# Patient Record
Sex: FEMALE | Race: WHITE | NOT HISPANIC OR LATINO | Employment: FULL TIME | URBAN - METROPOLITAN AREA
[De-identification: names, ages, dates, MRNs, and addresses within clinical notes are randomized per-mention and may not be internally consistent; named-entity substitution may affect disease eponyms.]

---

## 2021-05-18 ENCOUNTER — LAB (OUTPATIENT)
Dept: LAB | Facility: HOSPITAL | Age: 56
End: 2021-05-18

## 2021-05-18 ENCOUNTER — TRANSCRIBE ORDERS (OUTPATIENT)
Dept: LAB | Facility: HOSPITAL | Age: 56
End: 2021-05-18

## 2021-05-18 DIAGNOSIS — Z40.00: ICD-10-CM

## 2021-05-18 DIAGNOSIS — Z79.899 ENCOUNTER FOR LONG-TERM (CURRENT) USE OF OTHER MEDICATIONS: ICD-10-CM

## 2021-05-18 DIAGNOSIS — Z40.00: Primary | ICD-10-CM

## 2021-05-18 LAB
ALBUMIN SERPL-MCNC: 4.1 G/DL (ref 3.5–5.2)
ALBUMIN/GLOB SERPL: 1.5 G/DL
ALP SERPL-CCNC: 94 U/L (ref 39–117)
ALT SERPL W P-5'-P-CCNC: 26 U/L (ref 1–33)
ANION GAP SERPL CALCULATED.3IONS-SCNC: 7 MMOL/L (ref 5–15)
AST SERPL-CCNC: 17 U/L (ref 1–32)
BASOPHILS # BLD AUTO: 0.09 10*3/MM3 (ref 0–0.2)
BASOPHILS NFR BLD AUTO: 1 % (ref 0–1.5)
BILIRUB SERPL-MCNC: 0.4 MG/DL (ref 0–1.2)
BUN SERPL-MCNC: 17 MG/DL (ref 6–20)
BUN/CREAT SERPL: 18.1 (ref 7–25)
CALCIUM SPEC-SCNC: 10 MG/DL (ref 8.6–10.5)
CHLORIDE SERPL-SCNC: 104 MMOL/L (ref 98–107)
CO2 SERPL-SCNC: 29 MMOL/L (ref 22–29)
CREAT SERPL-MCNC: 0.94 MG/DL (ref 0.57–1)
DEPRECATED RDW RBC AUTO: 39.8 FL (ref 37–54)
EOSINOPHIL # BLD AUTO: 0.27 10*3/MM3 (ref 0–0.4)
EOSINOPHIL NFR BLD AUTO: 2.9 % (ref 0.3–6.2)
ERYTHROCYTE [DISTWIDTH] IN BLOOD BY AUTOMATED COUNT: 12.3 % (ref 12.3–15.4)
GFR SERPL CREATININE-BSD FRML MDRD: 62 ML/MIN/1.73
GLOBULIN UR ELPH-MCNC: 2.8 GM/DL
GLUCOSE SERPL-MCNC: 113 MG/DL (ref 65–99)
HAV IGM SERPL QL IA: NORMAL
HBV CORE IGM SERPL QL IA: NORMAL
HBV SURFACE AG SERPL QL IA: NORMAL
HCT VFR BLD AUTO: 44.5 % (ref 34–46.6)
HCV AB SER DONR QL: NORMAL
HGB BLD-MCNC: 14.8 G/DL (ref 12–15.9)
IMM GRANULOCYTES # BLD AUTO: 0.05 10*3/MM3 (ref 0–0.05)
IMM GRANULOCYTES NFR BLD AUTO: 0.5 % (ref 0–0.5)
LYMPHOCYTES # BLD AUTO: 2.8 10*3/MM3 (ref 0.7–3.1)
LYMPHOCYTES NFR BLD AUTO: 30.2 % (ref 19.6–45.3)
MCH RBC QN AUTO: 29.8 PG (ref 26.6–33)
MCHC RBC AUTO-ENTMCNC: 33.3 G/DL (ref 31.5–35.7)
MCV RBC AUTO: 89.5 FL (ref 79–97)
MONOCYTES # BLD AUTO: 0.52 10*3/MM3 (ref 0.1–0.9)
MONOCYTES NFR BLD AUTO: 5.6 % (ref 5–12)
NEUTROPHILS NFR BLD AUTO: 5.54 10*3/MM3 (ref 1.7–7)
NEUTROPHILS NFR BLD AUTO: 59.8 % (ref 42.7–76)
NRBC BLD AUTO-RTO: 0.1 /100 WBC (ref 0–0.2)
PLATELET # BLD AUTO: 350 10*3/MM3 (ref 140–450)
PMV BLD AUTO: 10.1 FL (ref 6–12)
POTASSIUM SERPL-SCNC: 4.7 MMOL/L (ref 3.5–5.2)
PROT SERPL-MCNC: 6.9 G/DL (ref 6–8.5)
RBC # BLD AUTO: 4.97 10*6/MM3 (ref 3.77–5.28)
SODIUM SERPL-SCNC: 140 MMOL/L (ref 136–145)
WBC # BLD AUTO: 9.27 10*3/MM3 (ref 3.4–10.8)

## 2021-05-18 PROCEDURE — 80074 ACUTE HEPATITIS PANEL: CPT

## 2021-05-18 PROCEDURE — 80053 COMPREHEN METABOLIC PANEL: CPT

## 2021-05-18 PROCEDURE — 85025 COMPLETE CBC W/AUTO DIFF WBC: CPT

## 2021-05-18 PROCEDURE — 36415 COLL VENOUS BLD VENIPUNCTURE: CPT

## 2021-05-25 ENCOUNTER — TRANSCRIBE ORDERS (OUTPATIENT)
Dept: LAB | Facility: HOSPITAL | Age: 56
End: 2021-05-25

## 2021-05-25 ENCOUNTER — LAB (OUTPATIENT)
Dept: LAB | Facility: HOSPITAL | Age: 56
End: 2021-05-25

## 2021-05-25 DIAGNOSIS — Z79.899 ENCOUNTER FOR LONG-TERM (CURRENT) USE OF OTHER MEDICATIONS: ICD-10-CM

## 2021-05-25 DIAGNOSIS — L40.0 PSORIASIS VULGARIS: ICD-10-CM

## 2021-05-25 DIAGNOSIS — L40.0 PSORIASIS VULGARIS: Primary | ICD-10-CM

## 2021-05-25 LAB
ALBUMIN SERPL-MCNC: 4.2 G/DL (ref 3.5–5.2)
ALBUMIN/GLOB SERPL: 1.6 G/DL
ALP SERPL-CCNC: 93 U/L (ref 39–117)
ALT SERPL W P-5'-P-CCNC: 17 U/L (ref 1–33)
ANION GAP SERPL CALCULATED.3IONS-SCNC: 9.5 MMOL/L (ref 5–15)
AST SERPL-CCNC: 11 U/L (ref 1–32)
BASOPHILS # BLD AUTO: 0.1 10*3/MM3 (ref 0–0.2)
BASOPHILS NFR BLD AUTO: 0.8 % (ref 0–1.5)
BILIRUB SERPL-MCNC: 0.2 MG/DL (ref 0–1.2)
BUN SERPL-MCNC: 18 MG/DL (ref 6–20)
BUN/CREAT SERPL: 23.1 (ref 7–25)
CALCIUM SPEC-SCNC: 10.1 MG/DL (ref 8.6–10.5)
CHLORIDE SERPL-SCNC: 104 MMOL/L (ref 98–107)
CO2 SERPL-SCNC: 24.5 MMOL/L (ref 22–29)
CREAT SERPL-MCNC: 0.78 MG/DL (ref 0.57–1)
DEPRECATED RDW RBC AUTO: 40.7 FL (ref 37–54)
EOSINOPHIL # BLD AUTO: 0.21 10*3/MM3 (ref 0–0.4)
EOSINOPHIL NFR BLD AUTO: 1.7 % (ref 0.3–6.2)
ERYTHROCYTE [DISTWIDTH] IN BLOOD BY AUTOMATED COUNT: 12.4 % (ref 12.3–15.4)
GFR SERPL CREATININE-BSD FRML MDRD: 77 ML/MIN/1.73
GLOBULIN UR ELPH-MCNC: 2.7 GM/DL
GLUCOSE SERPL-MCNC: 112 MG/DL (ref 65–99)
HCT VFR BLD AUTO: 42.1 % (ref 34–46.6)
HGB BLD-MCNC: 13.8 G/DL (ref 12–15.9)
IMM GRANULOCYTES # BLD AUTO: 0.04 10*3/MM3 (ref 0–0.05)
IMM GRANULOCYTES NFR BLD AUTO: 0.3 % (ref 0–0.5)
LYMPHOCYTES # BLD AUTO: 3.07 10*3/MM3 (ref 0.7–3.1)
LYMPHOCYTES NFR BLD AUTO: 25 % (ref 19.6–45.3)
MCH RBC QN AUTO: 29.7 PG (ref 26.6–33)
MCHC RBC AUTO-ENTMCNC: 32.8 G/DL (ref 31.5–35.7)
MCV RBC AUTO: 90.5 FL (ref 79–97)
MONOCYTES # BLD AUTO: 0.63 10*3/MM3 (ref 0.1–0.9)
MONOCYTES NFR BLD AUTO: 5.1 % (ref 5–12)
NEUTROPHILS NFR BLD AUTO: 67.1 % (ref 42.7–76)
NEUTROPHILS NFR BLD AUTO: 8.21 10*3/MM3 (ref 1.7–7)
NRBC BLD AUTO-RTO: 0 /100 WBC (ref 0–0.2)
PLATELET # BLD AUTO: 341 10*3/MM3 (ref 140–450)
PMV BLD AUTO: 10.2 FL (ref 6–12)
POTASSIUM SERPL-SCNC: 4.1 MMOL/L (ref 3.5–5.2)
PROT SERPL-MCNC: 6.9 G/DL (ref 6–8.5)
RBC # BLD AUTO: 4.65 10*6/MM3 (ref 3.77–5.28)
SODIUM SERPL-SCNC: 138 MMOL/L (ref 136–145)
WBC # BLD AUTO: 12.26 10*3/MM3 (ref 3.4–10.8)

## 2021-05-25 PROCEDURE — 36415 COLL VENOUS BLD VENIPUNCTURE: CPT

## 2021-05-25 PROCEDURE — 80053 COMPREHEN METABOLIC PANEL: CPT

## 2021-05-25 PROCEDURE — 85025 COMPLETE CBC W/AUTO DIFF WBC: CPT

## 2021-06-01 ENCOUNTER — LAB (OUTPATIENT)
Dept: LAB | Facility: HOSPITAL | Age: 56
End: 2021-06-01

## 2021-06-01 ENCOUNTER — TRANSCRIBE ORDERS (OUTPATIENT)
Dept: LAB | Facility: HOSPITAL | Age: 56
End: 2021-06-01

## 2021-06-01 DIAGNOSIS — Z79.899 ENCOUNTER FOR LONG-TERM (CURRENT) USE OF OTHER MEDICATIONS: ICD-10-CM

## 2021-06-01 DIAGNOSIS — L40.0 PSORIASIS VULGARIS: Primary | ICD-10-CM

## 2021-06-01 DIAGNOSIS — L40.0 PSORIASIS VULGARIS: ICD-10-CM

## 2021-06-01 LAB
ALBUMIN SERPL-MCNC: 4 G/DL (ref 3.5–5.2)
ALBUMIN/GLOB SERPL: 1.4 G/DL
ALP SERPL-CCNC: 103 U/L (ref 39–117)
ALT SERPL W P-5'-P-CCNC: 18 U/L (ref 1–33)
ANION GAP SERPL CALCULATED.3IONS-SCNC: 9.2 MMOL/L (ref 5–15)
AST SERPL-CCNC: 15 U/L (ref 1–32)
BASOPHILS # BLD AUTO: 0.11 10*3/MM3 (ref 0–0.2)
BASOPHILS NFR BLD AUTO: 0.9 % (ref 0–1.5)
BILIRUB SERPL-MCNC: 0.4 MG/DL (ref 0–1.2)
BUN SERPL-MCNC: 19 MG/DL (ref 6–20)
BUN/CREAT SERPL: 24.4 (ref 7–25)
CALCIUM SPEC-SCNC: 9.7 MG/DL (ref 8.6–10.5)
CHLORIDE SERPL-SCNC: 101 MMOL/L (ref 98–107)
CO2 SERPL-SCNC: 27.8 MMOL/L (ref 22–29)
CREAT SERPL-MCNC: 0.78 MG/DL (ref 0.57–1)
DEPRECATED RDW RBC AUTO: 40.7 FL (ref 37–54)
EOSINOPHIL # BLD AUTO: 0.25 10*3/MM3 (ref 0–0.4)
EOSINOPHIL NFR BLD AUTO: 2 % (ref 0.3–6.2)
ERYTHROCYTE [DISTWIDTH] IN BLOOD BY AUTOMATED COUNT: 12.5 % (ref 12.3–15.4)
GFR SERPL CREATININE-BSD FRML MDRD: 77 ML/MIN/1.73
GLOBULIN UR ELPH-MCNC: 2.9 GM/DL
GLUCOSE SERPL-MCNC: 148 MG/DL (ref 65–99)
HCT VFR BLD AUTO: 42.4 % (ref 34–46.6)
HGB BLD-MCNC: 14.1 G/DL (ref 12–15.9)
IMM GRANULOCYTES # BLD AUTO: 0.07 10*3/MM3 (ref 0–0.05)
IMM GRANULOCYTES NFR BLD AUTO: 0.6 % (ref 0–0.5)
LYMPHOCYTES # BLD AUTO: 3.34 10*3/MM3 (ref 0.7–3.1)
LYMPHOCYTES NFR BLD AUTO: 26.6 % (ref 19.6–45.3)
MCH RBC QN AUTO: 30.2 PG (ref 26.6–33)
MCHC RBC AUTO-ENTMCNC: 33.3 G/DL (ref 31.5–35.7)
MCV RBC AUTO: 90.8 FL (ref 79–97)
MONOCYTES # BLD AUTO: 0.66 10*3/MM3 (ref 0.1–0.9)
MONOCYTES NFR BLD AUTO: 5.3 % (ref 5–12)
NEUTROPHILS NFR BLD AUTO: 64.6 % (ref 42.7–76)
NEUTROPHILS NFR BLD AUTO: 8.14 10*3/MM3 (ref 1.7–7)
NRBC BLD AUTO-RTO: 0 /100 WBC (ref 0–0.2)
PLATELET # BLD AUTO: 375 10*3/MM3 (ref 140–450)
PMV BLD AUTO: 10 FL (ref 6–12)
POTASSIUM SERPL-SCNC: 3.6 MMOL/L (ref 3.5–5.2)
PROT SERPL-MCNC: 6.9 G/DL (ref 6–8.5)
RBC # BLD AUTO: 4.67 10*6/MM3 (ref 3.77–5.28)
SODIUM SERPL-SCNC: 138 MMOL/L (ref 136–145)
WBC # BLD AUTO: 12.57 10*3/MM3 (ref 3.4–10.8)

## 2021-06-01 PROCEDURE — 80053 COMPREHEN METABOLIC PANEL: CPT

## 2021-06-01 PROCEDURE — 36415 COLL VENOUS BLD VENIPUNCTURE: CPT

## 2021-06-01 PROCEDURE — 85025 COMPLETE CBC W/AUTO DIFF WBC: CPT

## 2021-07-08 ENCOUNTER — TRANSCRIBE ORDERS (OUTPATIENT)
Dept: LAB | Facility: HOSPITAL | Age: 56
End: 2021-07-08

## 2021-07-08 ENCOUNTER — LAB (OUTPATIENT)
Dept: LAB | Facility: HOSPITAL | Age: 56
End: 2021-07-08

## 2021-07-08 DIAGNOSIS — L40.0 PSORIASIS VULGARIS: Primary | ICD-10-CM

## 2021-07-08 DIAGNOSIS — Z79.899 ENCOUNTER FOR LONG-TERM (CURRENT) USE OF OTHER MEDICATIONS: ICD-10-CM

## 2021-07-08 LAB
ALBUMIN SERPL-MCNC: 4.6 G/DL (ref 3.5–5.2)
ALBUMIN/GLOB SERPL: 2 G/DL
ALP SERPL-CCNC: 93 U/L (ref 39–117)
ALT SERPL W P-5'-P-CCNC: 24 U/L (ref 1–33)
ANION GAP SERPL CALCULATED.3IONS-SCNC: 10.6 MMOL/L (ref 5–15)
AST SERPL-CCNC: 14 U/L (ref 1–32)
BASOPHILS # BLD AUTO: 0.1 10*3/MM3 (ref 0–0.2)
BASOPHILS NFR BLD AUTO: 1.1 % (ref 0–1.5)
BILIRUB SERPL-MCNC: 0.2 MG/DL (ref 0–1.2)
BUN SERPL-MCNC: 11 MG/DL (ref 6–20)
BUN/CREAT SERPL: 13.3 (ref 7–25)
CALCIUM SPEC-SCNC: 9.8 MG/DL (ref 8.6–10.5)
CHLORIDE SERPL-SCNC: 103 MMOL/L (ref 98–107)
CO2 SERPL-SCNC: 25.4 MMOL/L (ref 22–29)
CREAT SERPL-MCNC: 0.83 MG/DL (ref 0.57–1)
DEPRECATED RDW RBC AUTO: 42.6 FL (ref 37–54)
EOSINOPHIL # BLD AUTO: 0.18 10*3/MM3 (ref 0–0.4)
EOSINOPHIL NFR BLD AUTO: 1.9 % (ref 0.3–6.2)
ERYTHROCYTE [DISTWIDTH] IN BLOOD BY AUTOMATED COUNT: 13.1 % (ref 12.3–15.4)
GFR SERPL CREATININE-BSD FRML MDRD: 72 ML/MIN/1.73
GLOBULIN UR ELPH-MCNC: 2.3 GM/DL
GLUCOSE SERPL-MCNC: 107 MG/DL (ref 65–99)
HCT VFR BLD AUTO: 43.7 % (ref 34–46.6)
HGB BLD-MCNC: 14.8 G/DL (ref 12–15.9)
IMM GRANULOCYTES # BLD AUTO: 0.02 10*3/MM3 (ref 0–0.05)
IMM GRANULOCYTES NFR BLD AUTO: 0.2 % (ref 0–0.5)
LYMPHOCYTES # BLD AUTO: 2.31 10*3/MM3 (ref 0.7–3.1)
LYMPHOCYTES NFR BLD AUTO: 24.7 % (ref 19.6–45.3)
MCH RBC QN AUTO: 30.6 PG (ref 26.6–33)
MCHC RBC AUTO-ENTMCNC: 33.9 G/DL (ref 31.5–35.7)
MCV RBC AUTO: 90.5 FL (ref 79–97)
MONOCYTES # BLD AUTO: 0.6 10*3/MM3 (ref 0.1–0.9)
MONOCYTES NFR BLD AUTO: 6.4 % (ref 5–12)
NEUTROPHILS NFR BLD AUTO: 6.14 10*3/MM3 (ref 1.7–7)
NEUTROPHILS NFR BLD AUTO: 65.7 % (ref 42.7–76)
NRBC BLD AUTO-RTO: 0 /100 WBC (ref 0–0.2)
PLATELET # BLD AUTO: 374 10*3/MM3 (ref 140–450)
PMV BLD AUTO: 10.3 FL (ref 6–12)
POTASSIUM SERPL-SCNC: 3.9 MMOL/L (ref 3.5–5.2)
PROT SERPL-MCNC: 6.9 G/DL (ref 6–8.5)
RBC # BLD AUTO: 4.83 10*6/MM3 (ref 3.77–5.28)
SODIUM SERPL-SCNC: 139 MMOL/L (ref 136–145)
WBC # BLD AUTO: 9.35 10*3/MM3 (ref 3.4–10.8)

## 2021-07-08 PROCEDURE — 80053 COMPREHEN METABOLIC PANEL: CPT

## 2021-07-08 PROCEDURE — 36415 COLL VENOUS BLD VENIPUNCTURE: CPT

## 2021-07-08 PROCEDURE — 85025 COMPLETE CBC W/AUTO DIFF WBC: CPT

## 2021-07-22 ENCOUNTER — TRANSCRIBE ORDERS (OUTPATIENT)
Dept: LAB | Facility: HOSPITAL | Age: 56
End: 2021-07-22

## 2021-07-22 ENCOUNTER — LAB (OUTPATIENT)
Dept: LAB | Facility: HOSPITAL | Age: 56
End: 2021-07-22

## 2021-07-22 DIAGNOSIS — Z79.899 ENCOUNTER FOR LONG-TERM (CURRENT) USE OF OTHER MEDICATIONS: ICD-10-CM

## 2021-07-22 DIAGNOSIS — L40.0 PSORIASIS VULGARIS: Primary | ICD-10-CM

## 2021-07-22 DIAGNOSIS — L40.0 PSORIASIS VULGARIS: ICD-10-CM

## 2021-07-22 LAB
ALBUMIN SERPL-MCNC: 4 G/DL (ref 3.5–5.2)
ALBUMIN/GLOB SERPL: 1.7 G/DL
ALP SERPL-CCNC: 83 U/L (ref 39–117)
ALT SERPL W P-5'-P-CCNC: 18 U/L (ref 1–33)
ANION GAP SERPL CALCULATED.3IONS-SCNC: 8.2 MMOL/L (ref 5–15)
AST SERPL-CCNC: 14 U/L (ref 1–32)
BASOPHILS # BLD AUTO: 0.12 10*3/MM3 (ref 0–0.2)
BASOPHILS NFR BLD AUTO: 1.2 % (ref 0–1.5)
BILIRUB SERPL-MCNC: <0.2 MG/DL (ref 0–1.2)
BUN SERPL-MCNC: 19 MG/DL (ref 6–20)
BUN/CREAT SERPL: 23.2 (ref 7–25)
CALCIUM SPEC-SCNC: 9.4 MG/DL (ref 8.6–10.5)
CHLORIDE SERPL-SCNC: 105 MMOL/L (ref 98–107)
CO2 SERPL-SCNC: 25.8 MMOL/L (ref 22–29)
CREAT SERPL-MCNC: 0.82 MG/DL (ref 0.57–1)
DEPRECATED RDW RBC AUTO: 46.4 FL (ref 37–54)
EOSINOPHIL # BLD AUTO: 0.28 10*3/MM3 (ref 0–0.4)
EOSINOPHIL NFR BLD AUTO: 2.8 % (ref 0.3–6.2)
ERYTHROCYTE [DISTWIDTH] IN BLOOD BY AUTOMATED COUNT: 13.6 % (ref 12.3–15.4)
GFR SERPL CREATININE-BSD FRML MDRD: 73 ML/MIN/1.73
GLOBULIN UR ELPH-MCNC: 2.3 GM/DL
GLUCOSE SERPL-MCNC: 96 MG/DL (ref 65–99)
HCT VFR BLD AUTO: 45.5 % (ref 34–46.6)
HGB BLD-MCNC: 14.4 G/DL (ref 12–15.9)
IMM GRANULOCYTES # BLD AUTO: 0.05 10*3/MM3 (ref 0–0.05)
IMM GRANULOCYTES NFR BLD AUTO: 0.5 % (ref 0–0.5)
LYMPHOCYTES # BLD AUTO: 2.78 10*3/MM3 (ref 0.7–3.1)
LYMPHOCYTES NFR BLD AUTO: 27.4 % (ref 19.6–45.3)
MCH RBC QN AUTO: 29.4 PG (ref 26.6–33)
MCHC RBC AUTO-ENTMCNC: 31.6 G/DL (ref 31.5–35.7)
MCV RBC AUTO: 92.9 FL (ref 79–97)
MONOCYTES # BLD AUTO: 0.74 10*3/MM3 (ref 0.1–0.9)
MONOCYTES NFR BLD AUTO: 7.3 % (ref 5–12)
NEUTROPHILS NFR BLD AUTO: 6.19 10*3/MM3 (ref 1.7–7)
NEUTROPHILS NFR BLD AUTO: 60.8 % (ref 42.7–76)
NRBC BLD AUTO-RTO: 0 /100 WBC (ref 0–0.2)
PLATELET # BLD AUTO: 347 10*3/MM3 (ref 140–450)
PMV BLD AUTO: 10.7 FL (ref 6–12)
POTASSIUM SERPL-SCNC: 4.5 MMOL/L (ref 3.5–5.2)
PROT SERPL-MCNC: 6.3 G/DL (ref 6–8.5)
RBC # BLD AUTO: 4.9 10*6/MM3 (ref 3.77–5.28)
SODIUM SERPL-SCNC: 139 MMOL/L (ref 136–145)
WBC # BLD AUTO: 10.16 10*3/MM3 (ref 3.4–10.8)

## 2021-07-22 PROCEDURE — 85025 COMPLETE CBC W/AUTO DIFF WBC: CPT

## 2021-07-22 PROCEDURE — 36415 COLL VENOUS BLD VENIPUNCTURE: CPT

## 2021-07-22 PROCEDURE — 80053 COMPREHEN METABOLIC PANEL: CPT

## 2021-10-28 ENCOUNTER — TREATMENT (OUTPATIENT)
Dept: PHYSICAL THERAPY | Facility: CLINIC | Age: 56
End: 2021-10-28

## 2021-10-28 DIAGNOSIS — M25.571 RIGHT ANKLE PAIN, UNSPECIFIED CHRONICITY: ICD-10-CM

## 2021-10-28 DIAGNOSIS — M65.9 TENOSYNOVITIS OF FOOT: ICD-10-CM

## 2021-10-28 DIAGNOSIS — M79.672 PAIN IN BOTH FEET: ICD-10-CM

## 2021-10-28 DIAGNOSIS — M79.671 PAIN IN BOTH FEET: ICD-10-CM

## 2021-10-28 DIAGNOSIS — M25.572 LEFT ANKLE PAIN, UNSPECIFIED CHRONICITY: Primary | ICD-10-CM

## 2021-10-28 PROCEDURE — 97110 THERAPEUTIC EXERCISES: CPT | Performed by: PHYSICAL THERAPIST

## 2021-10-28 PROCEDURE — 97162 PT EVAL MOD COMPLEX 30 MIN: CPT | Performed by: PHYSICAL THERAPIST

## 2021-10-28 PROCEDURE — 97140 MANUAL THERAPY 1/> REGIONS: CPT | Performed by: PHYSICAL THERAPIST

## 2021-10-28 NOTE — PROGRESS NOTES
Physical Therapy Initial Evaluation and Plan of Care    Patient: Sweetie Pickard   : 1965  Diagnosis/ICD-10 Code:  Left ankle pain, unspecified chronicity [M25.572]  Referring practitioner: Marisol Mccormack DPM  Date of Initial Visit: 10/28/2021  Today's Date: 10/28/2021  Patient seen for 1 sessions           Subjective Questionnaire: FAAM = 51/84, indicating 61% ability and 39% impairment      Subjective Evaluation    History of Present Illness  Mechanism of injury: Pt underwent bunion surgery B feet in 2019 (months apart). Then had hardware removed this year. Has had pain since both surgeries with more pain after hardware removal. States since then, her R big toe and 2nd toe do not touch the ground and she is bearing more wt on outside of foot. Pt her R ankle has started to hurt now because of this. Pain in R foot is along lateral side of foot and up to ankle. Has pain at 1st MTP jt B. L second toe is crossing over 3rd toe. States today is a good day, she has been on vacation all week and has not worked. States she in on her feet all day at work and her feet start to swell. Reports that as her feet swell, it feels like someone is pulling her toes apart. Swelling does not extend to her ankles. Standing and walking increases pain. Difficulty walking on uneven surfaces. Difficulty walking up and down hills with going down hill more challenging. Shifts her wt a lot to one leg due to pain. Has had injections in B feet but cannot have any more for a while. Gets some relief with ice. Has tried different shoes. Has custom orthotics but states they are not very comfortable.      Patient Occupation:  at [employer], full time Pain  Current pain ratin  At best pain ratin  At worst pain ratin  Location: B feet  Quality: dull ache and pressure  Relieving factors: rest  Aggravating factors: movement, standing, stairs, ambulation, prolonged positioning and squatting  Progression: no  change    Social Support  Lives with: spouse and adult children    Treatments  Previous treatment: injection treatment  Patient Goals  Patient goals for therapy: decreased edema, decreased pain, improved balance, increased motion, increased strength and return to sport/leisure activities  Patient goal: be able to work with less pain.           Objective          Observations   Left Ankle/Foot   Positive for deformity.     Right Ankle/Foot   Positive for deformity.     Additional Ankle/Foot Observation Details  B hallux valugs, R significantly greater. On R foot, 2nd toe starting to go over 3rd toe. In standing, R great toe does not touch the ground with >0.5cm clearance. Toes with extension at MTP joints and flexion at PIP joints. Scar well healed from bunion removal and subsequent hardware removal. Mild edema at 1st MTP joints and anterior ankle B, R>L. Callused area present at 5th MTP B, L>R.  R foot turned out slightly compared to L with hips and knees in neutral.  Gait: no assistive device, decreased R toe off, increased wt bearing on lateral aspect of R foot.    Palpation   Left   Tenderness of the peroneus.     Tenderness   Left Ankle/Foot   Tenderness in the fifth metatarsal base, metatarsal head and peroneal tendon.     Additional Tenderness Details  Tenderness at dorsum of R foot along extensor digitorum and extensor hallux tendons.    Active Range of Motion   Left Ankle/Foot   Normal active range of motion    Right Ankle/Foot   Normal active range of motion    Additional Active Range of Motion Details  Mild tightness B DF.    Joint Play   Left Ankle/Foot  Hypomobile in the midfoot and forefoot.     Right Ankle/Foot  Hypomobile in the midfoot and forefoot.     Strength/Myotome Testing     Left Ankle/Foot   Dorsiflexion: 4+  Plantar flexion: 4+  Inversion: 4+  Eversion: 4+    Right Ankle/Foot   Dorsiflexion: 4+  Plantar flexion: 4+  Inversion: 4-  Eversion: 3+          Assessment & Plan      Assessment  Impairments: abnormal gait, abnormal or restricted ROM, activity intolerance, impaired balance, impaired physical strength, lacks appropriate home exercise program and pain with function  Assessment details: Pt is 55 yo female with B foot pain, R>L, following bunion removal 2 yr ago and then hardware removal this year. Pt presents with decreased AROM and mobility of mid and forefoot B. Pt with tightness of extensor digitorum and extensor hallux with tenderness of tendons to palpation. Pt with weakness of R ankle with difficulty walkong on uneven surfaces due to instability. Pt is having difficulty with standing. Difficulty with ambulation with impaired gait pattern and progression of wt over R foot during stance phase. Pt is having difficulty with severe pain at end of work shift. FAAM indicates 61% ability.    .Patient presents with the impairments listed above and based on the objective findings and the physical therapy evaluation, the patient’s condition has the potential to improve in response to therapy.   The patient’s condition and/or services required are at a level of complexity that necessitates the skill & supervision of a physical therapist.    Prognosis: good  Functional Limitations: sleeping, walking, uncomfortable because of pain, sitting, standing and stooping  Goals  Plan Goals: STG:  - R great toe to touch ground and bear wt in static standing with min cues in 3 weeks.  - PT to assess SLS in 3-4 visits.  - Pt to be independent with HEP in 3 weeks.  LTG:  - Improve FAAM score to 80% or better ability by discharge.  - Increase R ankle INV and EV strength to 4/5 or greater for improved ability to ambulate on uneven surfaces by discharge.  - Ambulation to be returned to OF by discharge.  - Pt to rate max pain at 3-4/10 while working by discharge.    Plan  Therapy options: will be seen for skilled physical therapy services  Planned modality interventions: thermotherapy (hydrocollator  packs), cryotherapy, ultrasound, dry needling and electrical stimulation/Russian stimulation  Other planned modality interventions: modalities as indicated  Planned therapy interventions: balance/weight-bearing training, manual therapy, flexibility, functional ROM exercises, gait training, home exercise program, joint mobilization, neuromuscular re-education, soft tissue mobilization, strengthening, stretching, therapeutic activities, postural training, body mechanics training and transfer training  Frequency: 2x week  Duration in weeks: 10  Treatment plan discussed with: patient  Plan details: Talked with pt about benefit of using knee high compression socks while working for management of swelling. Will continue discussion at subsequent visits.        Timed:         Manual Therapy:    15     mins  66969;     Therapeutic Exercise:    10     mins  94843;     Neuromuscular Mikel:        mins  25650;    Therapeutic Activity:          mins  38814;     Gait Training:           mins  20811;     Ultrasound:          mins  23698;    Ionto                                   mins   92524  Can Repos          mins 67118    Un-Timed:  Electrical Stimulation:         mins  57743 ( );  Dry Needling          mins self-pay  Traction          mins 68550  Low Eval          Mins  53442  Mod Eval     30     Mins  40816  High Eval                           Mins  84656  Self - Care                          mins  01545        Timed Treatment:   25   mins   Total Treatment:     55   mins    PT SIGNATURE: Florinda Dominguez, PT   DATE TREATMENT INITIATED: 10/28/2021    Initial Certification  Certification Period: 1/26/2022  I certify that the therapy services are furnished while this patient is under my care.  The services outlined above are required by this patient, and will be reviewed every 90 days.     PHYSICIAN: Marisol Mccormack DPM ___________________________________________________________     DATE:   __________________________________________________________    Please sign and return via fax to [phone].. Thank you, [facility].

## 2021-11-04 ENCOUNTER — LAB (OUTPATIENT)
Dept: LAB | Facility: HOSPITAL | Age: 56
End: 2021-11-04

## 2021-11-04 ENCOUNTER — TRANSCRIBE ORDERS (OUTPATIENT)
Dept: ADMINISTRATIVE | Facility: HOSPITAL | Age: 56
End: 2021-11-04

## 2021-11-04 DIAGNOSIS — L40.0 PSORIASIS VULGARIS: ICD-10-CM

## 2021-11-04 DIAGNOSIS — L40.0 PSORIASIS VULGARIS: Primary | ICD-10-CM

## 2021-11-04 LAB
ALBUMIN SERPL-MCNC: 4.2 G/DL (ref 3.5–5.2)
ALP SERPL-CCNC: 98 U/L (ref 39–117)
ALT SERPL W P-5'-P-CCNC: 21 U/L (ref 1–33)
AST SERPL-CCNC: 18 U/L (ref 1–32)
BASOPHILS # BLD AUTO: 0.1 10*3/MM3 (ref 0–0.2)
BASOPHILS NFR BLD AUTO: 0.9 % (ref 0–1.5)
BILIRUB CONJ SERPL-MCNC: <0.2 MG/DL (ref 0–0.3)
BILIRUB INDIRECT SERPL-MCNC: NORMAL MG/DL
BILIRUB SERPL-MCNC: 0.3 MG/DL (ref 0–1.2)
DEPRECATED RDW RBC AUTO: 42.5 FL (ref 37–54)
EOSINOPHIL # BLD AUTO: 0.27 10*3/MM3 (ref 0–0.4)
EOSINOPHIL NFR BLD AUTO: 2.4 % (ref 0.3–6.2)
ERYTHROCYTE [DISTWIDTH] IN BLOOD BY AUTOMATED COUNT: 13.3 % (ref 12.3–15.4)
HCT VFR BLD AUTO: 41.1 % (ref 34–46.6)
HGB BLD-MCNC: 13.9 G/DL (ref 12–15.9)
IMM GRANULOCYTES # BLD AUTO: 0.06 10*3/MM3 (ref 0–0.05)
IMM GRANULOCYTES NFR BLD AUTO: 0.5 % (ref 0–0.5)
LYMPHOCYTES # BLD AUTO: 2.78 10*3/MM3 (ref 0.7–3.1)
LYMPHOCYTES NFR BLD AUTO: 25.2 % (ref 19.6–45.3)
MCH RBC QN AUTO: 30.2 PG (ref 26.6–33)
MCHC RBC AUTO-ENTMCNC: 33.8 G/DL (ref 31.5–35.7)
MCV RBC AUTO: 89.2 FL (ref 79–97)
MONOCYTES # BLD AUTO: 0.85 10*3/MM3 (ref 0.1–0.9)
MONOCYTES NFR BLD AUTO: 7.7 % (ref 5–12)
NEUTROPHILS NFR BLD AUTO: 6.97 10*3/MM3 (ref 1.7–7)
NEUTROPHILS NFR BLD AUTO: 63.3 % (ref 42.7–76)
NRBC BLD AUTO-RTO: 0 /100 WBC (ref 0–0.2)
PLATELET # BLD AUTO: 403 10*3/MM3 (ref 140–450)
PMV BLD AUTO: 10 FL (ref 6–12)
PROT SERPL-MCNC: 6.8 G/DL (ref 6–8.5)
RBC # BLD AUTO: 4.61 10*6/MM3 (ref 3.77–5.28)
WBC # BLD AUTO: 11.03 10*3/MM3 (ref 3.4–10.8)

## 2021-11-04 PROCEDURE — 80076 HEPATIC FUNCTION PANEL: CPT

## 2021-11-04 PROCEDURE — 36415 COLL VENOUS BLD VENIPUNCTURE: CPT

## 2021-11-04 PROCEDURE — 85025 COMPLETE CBC W/AUTO DIFF WBC: CPT

## 2021-11-09 ENCOUNTER — TREATMENT (OUTPATIENT)
Dept: PHYSICAL THERAPY | Facility: CLINIC | Age: 56
End: 2021-11-09

## 2021-11-09 DIAGNOSIS — M25.572 LEFT ANKLE PAIN, UNSPECIFIED CHRONICITY: Primary | ICD-10-CM

## 2021-11-09 DIAGNOSIS — M25.571 RIGHT ANKLE PAIN, UNSPECIFIED CHRONICITY: ICD-10-CM

## 2021-11-09 DIAGNOSIS — M79.672 PAIN IN BOTH FEET: ICD-10-CM

## 2021-11-09 DIAGNOSIS — M65.9 TENOSYNOVITIS OF FOOT: ICD-10-CM

## 2021-11-09 DIAGNOSIS — M79.671 PAIN IN BOTH FEET: ICD-10-CM

## 2021-11-09 PROCEDURE — 97110 THERAPEUTIC EXERCISES: CPT | Performed by: PHYSICAL THERAPIST

## 2021-11-09 PROCEDURE — 97140 MANUAL THERAPY 1/> REGIONS: CPT | Performed by: PHYSICAL THERAPIST

## 2021-11-09 PROCEDURE — 97035 APP MDLTY 1+ULTRASOUND EA 15: CPT | Performed by: PHYSICAL THERAPIST

## 2021-11-09 NOTE — PROGRESS NOTES
Physical Therapy Daily Progress Note      Patient: Sweetie Pickard   : 1965  Diagnosis/ICD-10 Code:  Left ankle pain, unspecified chronicity [M25.572]   Problems Addressed this Visit     None      Visit Diagnoses     Left ankle pain, unspecified chronicity    -  Primary    Right ankle pain, unspecified chronicity        Pain in both feet        Tenosynovitis of foot          Diagnoses       Codes Comments    Left ankle pain, unspecified chronicity    -  Primary ICD-10-CM: M25.572  ICD-9-CM: 719.47     Right ankle pain, unspecified chronicity     ICD-10-CM: M25.571  ICD-9-CM: 719.47     Pain in both feet     ICD-10-CM: M79.671, M79.672  ICD-9-CM: 729.5     Tenosynovitis of foot     ICD-10-CM: M65.9  ICD-9-CM: 727.06         Referring practitioner: Marisol Mccormack DPM  Date of Initial Visit: Type: THERAPY  Noted: 10/28/2021  Today's Date: 2021    VISIT#: 2    Subjective : pt reports she got her custom orthotics back since last visit and states they still do not feel right. Pt states she wore them for a couple hours and had increased pain and could hardly walk the next day. Has not used them since. Feels like her toes are getting stronger and is starting to be able to move R big toe more. Current pain at 5-6/10.      Objective : Added kinesiotaping: I strip to dorsum of foot and up anterior leg with foot in PF; another I strip encouraging eversion to neutral (from top of foot, under arch, and up lateral aspect of lower leg.    See Exercise, Manual, and Modality Logs for complete treatment.     Assessment/Plan : Continued pain B feet but improving R great toe flexion AROM. Continued pain along lateral aspect of 5th metatarsal and at distal end near peroneal insertion. Good tolerance to ther ex. Assess response to kinesiotaping next visit.    STG:  - R great toe to touch ground and bear wt in static standing with min cues in 3 weeks.  - PT to assess SLS in 3-4 visits.  - Pt to be independent with HEP in  3 weeks.  LTG:  - Improve FAAM score to 80% or better ability by discharge.  - Increase R ankle INV and EV strength to 4/5 or greater for improved ability to ambulate on uneven surfaces by discharge.  - Ambulation to be returned to OF by discharge.  - Pt to rate max pain at 3-4/10 while working by discharge.    Progress per Plan of Care         Timed:         Manual Therapy:    20     mins  14087;     Therapeutic Exercise:    10     mins  56017;     Neuromuscular Mikel:        mins  95174;    Therapeutic Activity:          mins  42701;     Gait Training:           mins  82251;     Ultrasound:    10      mins  97527;    Ionto                                   mins   07155  Self Care                            mins   63403  Canalith Repos                   mins  04968    Un-Timed:  Electrical Stimulation:         mins  41733 ( );  Dry Needling          mins self-pay  Traction          mins 27582  Low Eval          Mins  29189  Mod Eval          Mins  04339  High Eval                            Mins  09899  Re-Eval                               mins  10446    Timed Treatment:   40   mins   Total Treatment:     40   mins    Florinda Dominguez, PT  Physical Therapist

## 2021-11-16 ENCOUNTER — TREATMENT (OUTPATIENT)
Dept: PHYSICAL THERAPY | Facility: CLINIC | Age: 56
End: 2021-11-16

## 2021-11-16 DIAGNOSIS — M79.672 PAIN IN BOTH FEET: ICD-10-CM

## 2021-11-16 DIAGNOSIS — M79.671 PAIN IN BOTH FEET: ICD-10-CM

## 2021-11-16 DIAGNOSIS — M25.572 LEFT ANKLE PAIN, UNSPECIFIED CHRONICITY: Primary | ICD-10-CM

## 2021-11-16 DIAGNOSIS — M65.9 TENOSYNOVITIS OF FOOT: ICD-10-CM

## 2021-11-16 DIAGNOSIS — M25.571 RIGHT ANKLE PAIN, UNSPECIFIED CHRONICITY: ICD-10-CM

## 2021-11-16 PROCEDURE — 97140 MANUAL THERAPY 1/> REGIONS: CPT | Performed by: PHYSICAL THERAPIST

## 2021-11-16 PROCEDURE — 97110 THERAPEUTIC EXERCISES: CPT | Performed by: PHYSICAL THERAPIST

## 2021-11-16 PROCEDURE — 97035 APP MDLTY 1+ULTRASOUND EA 15: CPT | Performed by: PHYSICAL THERAPIST

## 2021-11-16 NOTE — PROGRESS NOTES
Physical Therapy Daily Progress Note      Patient: Sweetie Pickard   : 1965  Diagnosis/ICD-10 Code:  Left ankle pain, unspecified chronicity [M25.572]   Problems Addressed this Visit     None      Visit Diagnoses     Left ankle pain, unspecified chronicity    -  Primary    Right ankle pain, unspecified chronicity        Pain in both feet        Tenosynovitis of foot          Diagnoses       Codes Comments    Left ankle pain, unspecified chronicity    -  Primary ICD-10-CM: M25.572  ICD-9-CM: 719.47     Right ankle pain, unspecified chronicity     ICD-10-CM: M25.571  ICD-9-CM: 719.47     Pain in both feet     ICD-10-CM: M79.671, M79.672  ICD-9-CM: 729.5     Tenosynovitis of foot     ICD-10-CM: M65.9  ICD-9-CM: 727.06         Referring practitioner: Marisol Mccormack DPM  Date of Initial Visit: Type: THERAPY  Noted: 10/28/2021  Today's Date: 2021    VISIT#: 3    Subjective : Pt reports that the side of her R foot is feeling better. Can tell her R big toe is coming in contact with floor more. Feels like R ankle is a little more stable and not wanting to roll out as much. Kinesiotape helped and stayed on for ~3 days. Still has swelling and feeling of L 1st and 2nd toes pulling apart. Pt agreeable to trying dry needling today. Doing exercises daily.    Objective : dry needling completed with written consent to the following areas: B deep fibular, aspect of R lateral foot and base of 5th metatarsal.  SLS: R = 1 sec, L = 11 sec  See Exercise, Manual, and Modality Logs for complete treatment.     Assessment/Plan : Decreased pain at start of session. Making gradual gains in R ankle eversion strength. Good response to kinesiotape to R foot. Good initial response to dry needling. Good tolerance to ther ex. Assess response to kinesiotaping next visit.     STG:  - R great toe to touch ground and bear wt in static standing with min cues in 3 weeks. - MET  - PT to assess SLS in 3-4 visits. - MET  - Pt to be  independent with HEP in 3 weeks.  LTG:  - Improve FAAM score to 80% or better ability by discharge.  - Increase R ankle INV and EV strength to 4/5 or greater for improved ability to ambulate on uneven surfaces by discharge.  - Ambulation to be returned to [facility] by discharge.  - Pt to rate max pain at 3-4/10 while working by discharge.    Progress per Plan of Care         Timed:         Manual Therapy:    15     mins  02860;     Therapeutic Exercise:    15     mins  48107;     Neuromuscular Mikel:        mins  64286;    Therapeutic Activity:          mins  29081;     Gait Training:           mins  91442;     Ultrasound:     10     mins  92651;    Ionto                                   mins   99465  Self Care                            mins   11438  Canalith Repos                   mins  43765    Un-Timed:  Electrical Stimulation:         mins  19667 ( );  Dry Needling          mins self-pay  Traction          mins 61531  Low Eval          Mins  63019  Mod Eval          Mins  29519  High Eval                            Mins  86375  Re-Eval                               mins  83131    Timed Treatment:   40   mins   Total Treatment:     40   mins    Florinda Dominguez, PT  Physical Therapist

## 2021-11-23 ENCOUNTER — TREATMENT (OUTPATIENT)
Dept: PHYSICAL THERAPY | Facility: CLINIC | Age: 56
End: 2021-11-23

## 2021-11-23 DIAGNOSIS — M79.672 PAIN IN BOTH FEET: ICD-10-CM

## 2021-11-23 DIAGNOSIS — M65.9 TENOSYNOVITIS OF FOOT: ICD-10-CM

## 2021-11-23 DIAGNOSIS — M25.571 RIGHT ANKLE PAIN, UNSPECIFIED CHRONICITY: ICD-10-CM

## 2021-11-23 DIAGNOSIS — M25.572 LEFT ANKLE PAIN, UNSPECIFIED CHRONICITY: Primary | ICD-10-CM

## 2021-11-23 DIAGNOSIS — M79.671 PAIN IN BOTH FEET: ICD-10-CM

## 2021-11-23 PROCEDURE — 97140 MANUAL THERAPY 1/> REGIONS: CPT | Performed by: PHYSICAL THERAPIST

## 2021-11-23 PROCEDURE — 97035 APP MDLTY 1+ULTRASOUND EA 15: CPT | Performed by: PHYSICAL THERAPIST

## 2021-11-23 PROCEDURE — 97110 THERAPEUTIC EXERCISES: CPT | Performed by: PHYSICAL THERAPIST

## 2021-11-23 NOTE — PROGRESS NOTES
Physical Therapy Daily Progress Note      Patient: Sweetie Pickard   : 1965  Diagnosis/ICD-10 Code:  Left ankle pain, unspecified chronicity [M25.572]  Referring practitioner: Marisol Mccormack DPM  Date of Initial Visit: Type: THERAPY  Noted: 10/28/2021  Today's Date: 2021  Patient seen for 4 sessions         Sweetie Pickard reports: she has brief improvement following therapy with pain but has gradual consistent improvement with ability to keep toe on floor while walking and performing exercises. Pt. States her worst pain remains on the outside of her R foot where it has been primarily but It is not as severe as previous visits today.    Objective   See Exercise, Manual, and Modality Logs for complete treatment.     Assessment/Plan   Pt. tolerates treatment well this visit and responds well to ultrasound and manual treatment and shows great understanding of exercises at this time. Pt. Needs continued progressed strengthening in B feet overall for better support and continued relief of pain.    Progress strengthening /stabilization /functional activity           Timed:         Manual Therapy:    10      mins  58130;     Therapeutic Exercise:    15     mins  68542;     Ultrasound:     10     mins  10252;        Timed Treatment:   35   mins   Total Treatment:     35   mins    Daisy Rodriguez PTA  Physical Therapist Assistant License #[id number]

## 2021-12-13 ENCOUNTER — LAB (OUTPATIENT)
Dept: LAB | Facility: HOSPITAL | Age: 56
End: 2021-12-13

## 2021-12-13 ENCOUNTER — TRANSCRIBE ORDERS (OUTPATIENT)
Dept: ADMINISTRATIVE | Facility: HOSPITAL | Age: 56
End: 2021-12-13

## 2021-12-13 DIAGNOSIS — L70.0 NODULAR ELASTOSIS WITH CYSTS AND COMEDONES OF FAVRE AND RACOUCHOT: Primary | ICD-10-CM

## 2021-12-13 DIAGNOSIS — L30.9 ACUTE DERMATITIS: ICD-10-CM

## 2021-12-13 DIAGNOSIS — L57.8 NODULAR ELASTOSIS WITH CYSTS AND COMEDONES OF FAVRE AND RACOUCHOT: ICD-10-CM

## 2021-12-13 DIAGNOSIS — C84.00 MYCOSIS FUNGOIDES, UNSPECIFIED BODY REGION (HCC): ICD-10-CM

## 2021-12-13 DIAGNOSIS — L65.9 HAIR LOSS: ICD-10-CM

## 2021-12-13 DIAGNOSIS — M33.00 CHILDHOOD TYPE DERMATOMYOSITIS (HCC): ICD-10-CM

## 2021-12-13 DIAGNOSIS — L57.8 NODULAR ELASTOSIS WITH CYSTS AND COMEDONES OF FAVRE AND RACOUCHOT: Primary | ICD-10-CM

## 2021-12-13 DIAGNOSIS — L64.8 OTHER ANDROGENIC ALOPECIA: ICD-10-CM

## 2021-12-13 DIAGNOSIS — L70.0 NODULAR ELASTOSIS WITH CYSTS AND COMEDONES OF FAVRE AND RACOUCHOT: ICD-10-CM

## 2021-12-13 LAB
HAV IGM SERPL QL IA: NORMAL
HBV CORE IGM SERPL QL IA: NORMAL
HBV SURFACE AG SERPL QL IA: NORMAL
HCV AB SER DONR QL: NORMAL

## 2021-12-13 PROCEDURE — 80074 ACUTE HEPATITIS PANEL: CPT

## 2021-12-13 PROCEDURE — 86480 TB TEST CELL IMMUN MEASURE: CPT

## 2021-12-13 PROCEDURE — 36415 COLL VENOUS BLD VENIPUNCTURE: CPT

## 2021-12-15 LAB
GAMMA INTERFERON BACKGROUND BLD IA-ACNC: 0.03 IU/ML
M TB IFN-G BLD-IMP: NEGATIVE
M TB IFN-G CD4+ BCKGRND COR BLD-ACNC: 0.06 IU/ML
M TB IFN-G CD4+CD8+ BCKGRND COR BLD-ACNC: 0.02 IU/ML
MITOGEN IGNF BLD-ACNC: >10 IU/ML
QUANTIFERON INCUBATION: NORMAL
SERVICE CMNT-IMP: NORMAL

## 2022-03-29 ENCOUNTER — DOCUMENTATION (OUTPATIENT)
Dept: PHYSICAL THERAPY | Facility: CLINIC | Age: 57
End: 2022-03-29

## 2022-03-29 DIAGNOSIS — M79.671 PAIN IN BOTH FEET: ICD-10-CM

## 2022-03-29 DIAGNOSIS — M79.672 PAIN IN BOTH FEET: ICD-10-CM

## 2022-03-29 DIAGNOSIS — M25.571 RIGHT ANKLE PAIN, UNSPECIFIED CHRONICITY: ICD-10-CM

## 2022-03-29 DIAGNOSIS — M65.9 TENOSYNOVITIS OF FOOT: ICD-10-CM

## 2022-03-29 DIAGNOSIS — M25.572 LEFT ANKLE PAIN, UNSPECIFIED CHRONICITY: Primary | ICD-10-CM

## 2022-03-29 NOTE — PROGRESS NOTES
Discharge Summary  Discharge Summary from Physical Therapy Report      Dates  PT visit: 10/28/2021 - 11/23/2021  Number of Visits: 4     Discharge Status of Patient: See Progress Note dated 11/23/2021    Goals: Not Met    Discharge Plan: Continue with current home exercise program as instructed    Comments : Pt did not return for additional tx. Pt reported continued B ankle/foot pain at last visit.    Date of Discharge 3/29/2022        Florinda Dominguez, PT  Physical Therapist  [place] Lic# [id number]

## 2023-03-02 ENCOUNTER — TRANSCRIBE ORDERS (OUTPATIENT)
Dept: ADMINISTRATIVE | Facility: HOSPITAL | Age: 58
End: 2023-03-02
Payer: COMMERCIAL

## 2023-03-02 DIAGNOSIS — R92.8 ABNORMALITY OF LEFT BREAST ON SCREENING MAMMOGRAM: Primary | ICD-10-CM

## 2023-03-09 ENCOUNTER — HOSPITAL ENCOUNTER (OUTPATIENT)
Dept: ULTRASOUND IMAGING | Facility: HOSPITAL | Age: 58
Discharge: HOME | End: 2023-03-09
Payer: COMMERCIAL

## 2023-03-09 ENCOUNTER — HOSPITAL ENCOUNTER (OUTPATIENT)
Dept: MAMMOGRAPHY | Facility: HOSPITAL | Age: 58
Discharge: HOME | End: 2023-03-09
Payer: COMMERCIAL

## 2023-03-09 DIAGNOSIS — R92.8 ABNORMALITY OF LEFT BREAST ON SCREENING MAMMOGRAM: ICD-10-CM

## 2023-03-09 PROCEDURE — 0 LIDOCAINE 1 % SOLUTION: Performed by: NURSE PRACTITIONER

## 2023-03-09 PROCEDURE — A4648 IMPLANTABLE TISSUE MARKER: HCPCS

## 2023-03-09 PROCEDURE — 88305 TISSUE EXAM BY PATHOLOGIST: CPT | Performed by: NURSE PRACTITIONER

## 2023-03-09 RX ORDER — LIDOCAINE HYDROCHLORIDE AND EPINEPHRINE 10; 10 MG/ML; UG/ML
10 INJECTION, SOLUTION INFILTRATION; PERINEURAL ONCE
Status: COMPLETED | OUTPATIENT
Start: 2023-03-09 | End: 2023-03-09

## 2023-03-09 RX ORDER — LIDOCAINE HYDROCHLORIDE 10 MG/ML
5 INJECTION, SOLUTION INFILTRATION; PERINEURAL ONCE
Status: COMPLETED | OUTPATIENT
Start: 2023-03-09 | End: 2023-03-09

## 2023-03-09 RX ADMIN — Medication 5 ML: at 14:18

## 2023-03-09 RX ADMIN — LIDOCAINE HYDROCHLORIDE,EPINEPHRINE BITARTRATE 10 ML: 10; .01 INJECTION, SOLUTION INFILTRATION; PERINEURAL at 14:19

## 2023-03-10 LAB
LAB AP CASE REPORT: NORMAL
LAB AP DIAGNOSIS COMMENT: NORMAL
PATH REPORT.FINAL DX SPEC: NORMAL
PATH REPORT.GROSS SPEC: NORMAL

## 2023-04-05 ENCOUNTER — TRANSCRIBE ORDERS (OUTPATIENT)
Dept: ADMINISTRATIVE | Facility: HOSPITAL | Age: 58
End: 2023-04-05
Payer: COMMERCIAL

## 2023-04-05 ENCOUNTER — HOSPITAL ENCOUNTER (OUTPATIENT)
Dept: GENERAL RADIOLOGY | Facility: HOSPITAL | Age: 58
Discharge: HOME | End: 2023-04-05

## 2023-04-05 DIAGNOSIS — Z02.71 ENCOUNTER FOR DISABILITY DETERMINATION: Primary | ICD-10-CM

## 2023-04-05 DIAGNOSIS — Z02.71 ENCOUNTER FOR DISABILITY DETERMINATION: ICD-10-CM

## 2023-04-05 PROCEDURE — 73560 X-RAY EXAM OF KNEE 1 OR 2: CPT

## 2023-05-11 ENCOUNTER — TRANSCRIBE ORDERS (OUTPATIENT)
Dept: PHYSICAL THERAPY | Facility: CLINIC | Age: 58
End: 2023-05-11
Payer: COMMERCIAL

## 2023-05-11 DIAGNOSIS — M19.072 ARTHRITIS OF LEFT FOOT: ICD-10-CM

## 2023-05-11 DIAGNOSIS — M17.11 ARTHRITIS OF RIGHT KNEE: ICD-10-CM

## 2023-05-11 DIAGNOSIS — S86.012A RUPTURE OF LEFT ACHILLES TENDON, INITIAL ENCOUNTER: Primary | ICD-10-CM

## 2023-05-24 ENCOUNTER — TREATMENT (OUTPATIENT)
Dept: PHYSICAL THERAPY | Facility: CLINIC | Age: 58
End: 2023-05-24
Payer: COMMERCIAL

## 2023-05-24 DIAGNOSIS — M25.562 CHRONIC PAIN OF LEFT KNEE: ICD-10-CM

## 2023-05-24 DIAGNOSIS — R29.898 WEAKNESS OF LEFT LOWER EXTREMITY: ICD-10-CM

## 2023-05-24 DIAGNOSIS — R26.9 GAIT DISTURBANCE: ICD-10-CM

## 2023-05-24 DIAGNOSIS — G89.29 CHRONIC PAIN OF RIGHT KNEE: ICD-10-CM

## 2023-05-24 DIAGNOSIS — G89.29 CHRONIC PAIN OF LEFT KNEE: ICD-10-CM

## 2023-05-24 DIAGNOSIS — M25.561 CHRONIC PAIN OF RIGHT KNEE: ICD-10-CM

## 2023-05-24 DIAGNOSIS — S86.012A RUPTURE OF LEFT ACHILLES TENDON, INITIAL ENCOUNTER: Primary | ICD-10-CM

## 2023-05-24 NOTE — PROGRESS NOTES
Physical Therapy Initial Evaluation and Plan of Care     [address] [place] [address] , [address], [place] [address]    Patient: Sweetie Pickard   : 1965  Diagnosis/ICD-10 Code:  Rupture of left Achilles tendon, initial encounter [S86.012A]  Referring practitioner: Satish Garcia MD  Date of Initial Visit: 2023  Today's Date: 2023  Patient seen for 1 sessions           Subjective Questionnaire: PT Functional Test: FAAM = , indicating 31% ability and 69% impairment      Subjective Evaluation    History of Present Illness  Mechanism of injury: Pt reports L heel pain started in Oct 2022. States her heel was burning. Was still trying to work prior to increased pain and has not been able to work since 10/7/2022. Pt had MRI and found to have L ruptured achilles. Pt has been in walking boot since 10/2022. Pt has just had series of gel injections in B knees for B knee OA. Still has B knee pain. Pt has pain with wt bearing and feels like nail going up her heel with if she stands >5 min. Has numbness at times. Currently on gabapentin with some relief. Able to do grocery shopping if she has motorized cart. States that since foot injury she has been sitting a lot and having trouble walking. Pt states she feels weak all over with all of this and having increased back pain since being in boot. Now going to pain management for back pain since ankle injury. Has pain shooting down L LE at times from her low back. Had epidural in low back ~3 wk ago and gave her some relief but back pain is starting to come back now. Has difficulty lifting L leg in/out bed. Pt reports she is able to start weaning out of her boot and has tried walking some at home without boot on. States her balance is not good. Falls back down at times when first getting up. At other times, she feels like she is doing to fall forward. Has fallen multiple times. Last fall was a couple weeks ago. Tried walking on grass and gravel and has not been able to do  "this without her boot on. Using cane all the time to help balance herself. Has had B bunion surgery in the past, has issues with R great toe and 2nd toe touching the ground when she is standing and walking.    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 Was on light duty related to multiple surgeries on B arms but stood the whole time. Currently on medical leave.     PLOF: independent ambulation, did grocery shopping, worked full-time      Patient Occupation:  at [employer] Quality of life: good    Pain  Current pain ratin  At best pain ratin  At worst pain rating: 10  Location: L heel  Quality: burning, needle-like, knife-like, tight and radiating  Relieving factors: medications and ice  Aggravating factors: movement, standing, stairs, ambulation, prolonged positioning, sleeping, squatting and repetitive movement  Progression: no change    Social Support  Lives in: trailer (ramp to enter home)  Lives with: spouse and adult children    Treatments  Current treatment: immobilization  Patient Goals  Patient goals for therapy: decreased edema, decreased pain, improved balance, increased motion, increased strength, return to sport/leisure activities, return to work and independence with ADLs/IADLs             Objective          Observations   Left Ankle/Foot   Positive for atrophy.     Additional Ankle/Foot Observation Details  Pt wearing B compression socks that do not come all the way to her knee. Socks stop ~2-3\" below her knee. Pt wearing walking boot on L. L gastroc atrophy noted.    Gait: standard cane in R hand, decreased kiah, step to pattern leading with L, decreased stance time on L.    Transfers:  Sit to stand with use of B UE and mild posterior LOB, pt had to sit back down and reattempt.  Sit > supine with modified independence but had to use B hands to assist L LE onto bed.    Static standing balance = Fair- with frequent mild posterior displacement of balance and pt able to self correct. " Increased wt bearing on R LE.    SLS = not assessed due to poor wt bearing tolerance L LE    Palpation   Left   Tenderness of the lateral gastrocnemius, medial gastrocnemius, peroneus and piriformis.     Tenderness     Left Hip   Tenderness in the greater trochanter.   Left Ankle/Foot   Tenderness in the Achilles insertion, peroneal tendon and proximal Achilles.     Additional Tenderness Details  Pt with probable pelvic obliquity with L anterior pelvic tilt vs upslip.     Active Range of Motion   Left Ankle/Foot   Dorsiflexion (ke): 8 degrees   Plantar flexion: 22 degrees   Inversion: 22 degrees   Eversion: 6 degrees     Right Ankle/Foot   Dorsiflexion (ke): 2 degrees   Plantar flexion: 46 degrees   Inversion: 28 degrees   Eversion: 0 degrees     Strength/Myotome Testing     Left Hip   Planes of Motion   Flexion: 2  Abduction: 3    Right Hip   Planes of Motion   Flexion: 4  Abduction: 4    Left Knee   Flexion: 4-  Extension: 4    Right Knee   Flexion: 4  Extension: 4    Left Ankle/Foot   Dorsiflexion: 3+  Plantar flexion: 3-  Inversion: 3+  Eversion: 3+    Right Ankle/Foot   Dorsiflexion: 4  Plantar flexion: 4  Inversion: 4+  Eversion: 4-  Great toe flexion: 3-  Great toe extension: 3-     General Comments     Hip Comments   Pt tender to palpation to posterior L thigh along sciatic nerve pathway.         Assessment & Plan     Assessment  Impairments: abnormal gait, abnormal or restricted ROM, activity intolerance, impaired balance, impaired physical strength, lacks appropriate home exercise program, pain with function, safety issue and weight-bearing intolerance  Functional Limitations: sleeping, walking, uncomfortable because of pain, moving in bed, sitting, standing, stooping and unable to perform repetitive tasks  Assessment details: Pt is 57 yo female with L foot/ankle pain since 10/2022 due to achilles tendon rupture. Pain has persisted and now having low back pain and radiating pain down L LE. Pt also with B  knee pain related to OA. Pt presents with decreased strength and ROM of L ankle in addition to decreased strength of B knees (L>R), and significant weakness at L hip. Pt with impaired standing balance and has frequent posterior LOB. Pt is having difficulty with all ambulation. Not able to do shopping. Difficulty lifting L LE in/out of bed and car. Difficulty with all standing. Pt with increased risk of falls. FAAM indicates 31% ability.    Patient presents with the impairments listed above and based on the objective findings and the physical therapy evaluation, the patient’s condition has the potential to improve in response to therapy.   The patient’s condition and/or services required are at a level of complexity that necessitates the skill & supervision of a physical therapist.    Prognosis: good    Goals  Plan Goals: STG to be met in 3 wk:  - Increase L ankle DF AROM to 35 deg for improved toe off during ambulation.  - PT to assess SLS in 4-6 visits as tolerance to wt bearing improves.  - Pt to ambulate up to 3 min in clinic in tennis shoes with standard cane and SBA with no LOB for safe ambulation in her home.  LTG to be met in 12 wk:  - Improve FAAM score to 75% or better ability.  - Increase L ankle strength to 4/5 or greater in all directions in order to walk in her yard (uneven surfaces) safely to help with yard work.  - Pt to ambulate x10 min with least restrictive device and no LOB in order to do light grocery shopping.  - Pt to report ability to stand up to 2 hr with no increase in symptoms in order to return to work.  - Pt to be independent with HEP.    Plan  Therapy options: will be seen for skilled therapy services  Planned modality interventions: thermotherapy (hydrocollator packs) and cryotherapy  Other planned modality interventions: modalities as indicated  Planned therapy interventions: balance/weight-bearing training, manual therapy, flexibility, functional ROM exercises, gait training, home  exercise program, joint mobilization, neuromuscular re-education, soft tissue mobilization, strengthening, stretching and therapeutic activities  Frequency: 2x week  Duration in weeks: 12  Treatment plan discussed with: patient        Timed:         Manual Therapy:    10     mins  97416;     Therapeutic Exercise:    15     mins  25511;     Neuromuscular Mikel:        mins  81240;    Therapeutic Activity:          mins  87628;     Gait Training:           mins  86304;     Ultrasound:          mins  47901;    Ionto                                   mins   95522  Self - Care                          mins  86423    Un-Timed:  Electrical Stimulation:         mins  36036 ( );  Dry Needling          20561/20560  Traction          mins 10642  Can Repos          mins 12081  Low Eval          Mins  08975  Mod Eval          Mins  78546  High Eval                       35     Mins  32516      Timed Treatment:   25   mins   Total Treatment:     60   mins      PT SIGNATURE: Florinda Dominguez PT, CLT  [place] Lic # [id number]  Electronically signed by Florinda Dominguez PT, 05/24/23, 1:33 PM EDT    Initial Certification  Certification Period: 5/24/2023 thru 8/21/2023  I certify that the therapy services are furnished while this patient is under my care.  The services outlined above are required by this patient, and will be reviewed every 90 days.     PHYSICIAN: Satish Garcia MD _____________________________________________________  NPI: [id number]                                      DATE:    Please sign and return via fax to [phone]. Thank you, [facility].

## 2023-06-01 ENCOUNTER — TREATMENT (OUTPATIENT)
Dept: PHYSICAL THERAPY | Facility: CLINIC | Age: 58
End: 2023-06-01

## 2023-06-01 DIAGNOSIS — G89.29 CHRONIC PAIN OF RIGHT KNEE: ICD-10-CM

## 2023-06-01 DIAGNOSIS — M25.572 LEFT ANKLE PAIN, UNSPECIFIED CHRONICITY: ICD-10-CM

## 2023-06-01 DIAGNOSIS — G89.29 CHRONIC PAIN OF LEFT KNEE: ICD-10-CM

## 2023-06-01 DIAGNOSIS — R26.9 GAIT DISTURBANCE: ICD-10-CM

## 2023-06-01 DIAGNOSIS — R29.898 WEAKNESS OF LEFT LOWER EXTREMITY: ICD-10-CM

## 2023-06-01 DIAGNOSIS — M25.561 CHRONIC PAIN OF RIGHT KNEE: ICD-10-CM

## 2023-06-01 DIAGNOSIS — M25.562 CHRONIC PAIN OF LEFT KNEE: ICD-10-CM

## 2023-06-01 DIAGNOSIS — S86.012A RUPTURE OF LEFT ACHILLES TENDON, INITIAL ENCOUNTER: Primary | ICD-10-CM

## 2023-06-01 NOTE — PROGRESS NOTES
Physical Therapy Daily Treatment Note      Patient: Sweetie Pickard   : 1965  Diagnosis/ICD-10 Code:  Rupture of left Achilles tendon, initial encounter [S86.012A]   Problems Addressed this Visit    None  Visit Diagnoses     Rupture of left Achilles tendon, initial encounter    -  Primary    Chronic pain of left knee        Chronic pain of right knee        Gait disturbance        Weakness of left lower extremity        Left ankle pain, unspecified chronicity          Diagnoses       Codes Comments    Rupture of left Achilles tendon, initial encounter    -  Primary ICD-10-CM: S86.012A  ICD-9-CM: 845.09     Chronic pain of left knee     ICD-10-CM: M25.562, G89.29  ICD-9-CM: 719.46, 338.29     Chronic pain of right knee     ICD-10-CM: M25.561, G89.29  ICD-9-CM: 719.46, 338.29     Gait disturbance     ICD-10-CM: R26.9  ICD-9-CM: 781.2     Weakness of left lower extremity     ICD-10-CM: R29.898  ICD-9-CM: 729.89     Left ankle pain, unspecified chronicity     ICD-10-CM: M25.572  ICD-9-CM: 719.47          Referring practitioner: Satish Garcia MD  Date of Initial Visit: Type: THERAPY  Noted: 2023  Today's Date: 2023    VISIT#: 2    Subjective  Patient reports hurting all over today after having long term disability assessment yesterday.        Objective added prostretch, CKC ball press     See Exercise, Manual, and Modality Logs for complete treatment.     Assessment/Plan Patient tolerated progressed therapeutic exercise well with only reports of increased fatigue. Patient with noticeable decreased control and with requested movement and will continue to benefit from improved L LE strengthening.   Goals  Plan Goals: STG to be met in 3 wk:  - Increase L ankle DF AROM to 35 deg for improved toe off during ambulation.  - PT to assess SLS in 4-6 visits as tolerance to wt bearing improves.  - Pt to ambulate up to 3 min in clinic in tennis shoes with standard cane and SBA with no LOB for safe ambulation in  her home.  LTG to be met in 12 wk:  - Improve FAAM score to 75% or better ability.  - Increase L ankle strength to 4/5 or greater in all directions in order to walk in her yard (uneven surfaces) safely to help with yard work.  - Pt to ambulate x10 min with least restrictive device and no LOB in order to do light grocery shopping.  - Pt to report ability to stand up to 2 hr with no increase in symptoms in order to return to work.  - Pt to be independent with HEP.    Progress per Plan of Care and Progress strengthening /stabilization /functional activity         Timed:         Manual Therapy:    10     mins  38104;     Therapeutic Exercise:    20     mins  94387;     Neuromuscular Mikel:        mins  66349;    Therapeutic Activity:          mins  91714;     Gait Training:           mins  38782;     Ultrasound:          mins  01392;    Ionto                                   mins   26555  Self Care                    _____  mins   07013  Canalith Repos                   mins  88747    Un-Timed:  Electrical Stimulation:        mins  70958 ( );  Dry Needling          mins self-pay   Traction          mins 15785    Timed Treatment:   30   mins   Total Treatment:     30   mins    Eloy Blue PTA  [place] License [id number]  Physical Therapist Assistant

## 2023-06-05 ENCOUNTER — TREATMENT (OUTPATIENT)
Dept: PHYSICAL THERAPY | Facility: CLINIC | Age: 58
End: 2023-06-05
Payer: COMMERCIAL

## 2023-06-05 DIAGNOSIS — M25.572 LEFT ANKLE PAIN, UNSPECIFIED CHRONICITY: ICD-10-CM

## 2023-06-05 DIAGNOSIS — M25.562 CHRONIC PAIN OF LEFT KNEE: ICD-10-CM

## 2023-06-05 DIAGNOSIS — G89.29 CHRONIC PAIN OF LEFT KNEE: ICD-10-CM

## 2023-06-05 DIAGNOSIS — G89.29 CHRONIC PAIN OF RIGHT KNEE: ICD-10-CM

## 2023-06-05 DIAGNOSIS — R29.898 WEAKNESS OF LEFT LOWER EXTREMITY: ICD-10-CM

## 2023-06-05 DIAGNOSIS — M25.561 CHRONIC PAIN OF RIGHT KNEE: ICD-10-CM

## 2023-06-05 DIAGNOSIS — S86.012A RUPTURE OF LEFT ACHILLES TENDON, INITIAL ENCOUNTER: Primary | ICD-10-CM

## 2023-06-05 DIAGNOSIS — R26.9 GAIT DISTURBANCE: ICD-10-CM

## 2023-06-05 PROCEDURE — 97110 THERAPEUTIC EXERCISES: CPT | Performed by: PHYSICAL THERAPIST

## 2023-06-05 PROCEDURE — 97530 THERAPEUTIC ACTIVITIES: CPT | Performed by: PHYSICAL THERAPIST

## 2023-06-05 PROCEDURE — 97140 MANUAL THERAPY 1/> REGIONS: CPT | Performed by: PHYSICAL THERAPIST

## 2023-06-05 NOTE — PROGRESS NOTES
Physical Therapy Daily Treatment Note      Patient: Sweetie Pickard   : 1965  Diagnosis/ICD-10 Code:  Rupture of left Achilles tendon, initial encounter [S86.012A]   Problems Addressed this Visit    None  Visit Diagnoses       Rupture of left Achilles tendon, initial encounter    -  Primary    Chronic pain of left knee        Chronic pain of right knee        Gait disturbance        Weakness of left lower extremity        Left ankle pain, unspecified chronicity              Diagnoses         Codes Comments    Rupture of left Achilles tendon, initial encounter    -  Primary ICD-10-CM: S86.012A  ICD-9-CM: 845.09     Chronic pain of left knee     ICD-10-CM: M25.562, G89.29  ICD-9-CM: 719.46, 338.29     Chronic pain of right knee     ICD-10-CM: M25.561, G89.29  ICD-9-CM: 719.46, 338.29     Gait disturbance     ICD-10-CM: R26.9  ICD-9-CM: 781.2     Weakness of left lower extremity     ICD-10-CM: R29.898  ICD-9-CM: 729.89     Left ankle pain, unspecified chronicity     ICD-10-CM: M25.572  ICD-9-CM: 719.47            Referring practitioner: Satish Garcia MD  Date of Initial Visit: Type: THERAPY  Noted: 2023  Today's Date: 2023    VISIT#: 3    Subjective Patient reports feeling a little better today, with decreased intensity of pain in knee/ankle on L side.       Objective     See Exercise, Manual, and Modality Logs for complete treatment.     Assessment/Plan Patient with continued weakness with L ankle and decreased stability with tennis shoe on with increased pain reported in foot. Patient will continue to benefit from improved L ankle strengthening for improved stability with daily functional activity.    Goals  Plan Goals: STG to be met in 3 wk:  - Increase L ankle DF AROM to 35 deg for improved toe off during ambulation.  - PT to assess SLS in 4-6 visits as tolerance to wt bearing improves.  - Pt to ambulate up to 3 min in clinic in tennis shoes with standard cane and SBA with no LOB for safe  ambulation in her home.  LTG to be met in 12 wk:  - Improve FAAM score to 75% or better ability.  - Increase L ankle strength to 4/5 or greater in all directions in order to walk in her yard (uneven surfaces) safely to help with yard work.  - Pt to ambulate x10 min with least restrictive device and no LOB in order to do light grocery shopping.  - Pt to report ability to stand up to 2 hr with no increase in symptoms in order to return to work.  - Pt to be independent with HEP    Progress per Plan of Care and Progress strengthening /stabilization /functional activity         Timed:         Manual Therapy:    10     mins  02867;     Therapeutic Exercise:    15     mins  68796;     Neuromuscular Mikel:        mins  59977;    Therapeutic Activity:     10     mins  73586;     Gait Training:           mins  68473;     Ultrasound:          mins  46289;    Ionto                                   mins   41557  Self Care                    _____  mins   52232  Canalith Repos                   mins  87824    Un-Timed:  Electrical Stimulation:         mins  18967 ( );  Dry Needling          mins self-pay   Traction          mins 56061    Timed Treatment:   35   mins   Total Treatment:     35   mins    Eloy Blue PTA  [place] License [id number]  Physical Therapist Assistant

## 2023-06-13 ENCOUNTER — TREATMENT (OUTPATIENT)
Dept: PHYSICAL THERAPY | Facility: CLINIC | Age: 58
End: 2023-06-13
Payer: COMMERCIAL

## 2023-06-13 DIAGNOSIS — M25.572 LEFT ANKLE PAIN, UNSPECIFIED CHRONICITY: ICD-10-CM

## 2023-06-13 DIAGNOSIS — G89.29 CHRONIC PAIN OF LEFT KNEE: ICD-10-CM

## 2023-06-13 DIAGNOSIS — M25.562 CHRONIC PAIN OF LEFT KNEE: ICD-10-CM

## 2023-06-13 DIAGNOSIS — S86.012A RUPTURE OF LEFT ACHILLES TENDON, INITIAL ENCOUNTER: Primary | ICD-10-CM

## 2023-06-13 DIAGNOSIS — G89.29 CHRONIC PAIN OF RIGHT KNEE: ICD-10-CM

## 2023-06-13 DIAGNOSIS — R29.898 WEAKNESS OF LEFT LOWER EXTREMITY: ICD-10-CM

## 2023-06-13 DIAGNOSIS — M25.561 CHRONIC PAIN OF RIGHT KNEE: ICD-10-CM

## 2023-06-13 DIAGNOSIS — R26.9 GAIT DISTURBANCE: ICD-10-CM

## 2023-06-13 NOTE — PROGRESS NOTES
Physical Therapy Daily Treatment Note      Patient: Sweetie Pickard   : 1965  Diagnosis/ICD-10 Code:  Rupture of left Achilles tendon, initial encounter [S86.012A]   Problems Addressed this Visit    None  Visit Diagnoses       Rupture of left Achilles tendon, initial encounter    -  Primary    Chronic pain of left knee        Chronic pain of right knee        Gait disturbance        Weakness of left lower extremity        Left ankle pain, unspecified chronicity              Diagnoses         Codes Comments    Rupture of left Achilles tendon, initial encounter    -  Primary ICD-10-CM: S86.012A  ICD-9-CM: 845.09     Chronic pain of left knee     ICD-10-CM: M25.562, G89.29  ICD-9-CM: 719.46, 338.29     Chronic pain of right knee     ICD-10-CM: M25.561, G89.29  ICD-9-CM: 719.46, 338.29     Gait disturbance     ICD-10-CM: R26.9  ICD-9-CM: 781.2     Weakness of left lower extremity     ICD-10-CM: R29.898  ICD-9-CM: 729.89     Left ankle pain, unspecified chronicity     ICD-10-CM: M25.572  ICD-9-CM: 719.47           Referring practitioner: Satish Garcia MD  Date of Initial Visit: Type: THERAPY  Noted: 2023  Today's Date: 2023    VISIT#: 4    Subjective : pt reports pain is still at back of ankle. Ankle might be a little stronger. Has stabbing pain if she walks on it too long, other times is burning. L knee is popping. L knee is still buckling frequently. Falls backwards onto her bed or chair when she stands up.      Objective : poor gait ability in L tennis shoe with use of cane in R hand and hand-held assist on L. Pt demonstrated poor wt bearing tolerance on L LE with increased flexion of L knee and increased ankle DF.     See Exercise, Manual, and Modality Logs for complete treatment.     Assessment/Plan : Continued severe pain. Poor tolerance to ther ex with pain in all movements. Encouraged pt to continue movement in small ROM. Pt needs continued L ankle strengthening and progression of wt bearing  activities to improve all transfers and ambulation.     Goals  Plan Goals: STG to be met in 3 wk:  - Increase L ankle DF AROM to 35 deg for improved toe off during ambulation.  - PT to assess SLS in 4-6 visits as tolerance to wt bearing improves.  - Pt to ambulate up to 3 min in clinic in tennis shoes with standard cane and SBA with no LOB for safe ambulation in her home.  LTG to be met in 12 wk:  - Improve FAAM score to 75% or better ability.  - Increase L ankle strength to 4/5 or greater in all directions in order to walk in her yard (uneven surfaces) safely to help with yard work.  - Pt to ambulate x10 min with least restrictive device and no LOB in order to do light grocery shopping.  - Pt to report ability to stand up to 2 hr with no increase in symptoms in order to return to work.  - Pt to be independent with HEP    Progress per Plan of Care and Progress strengthening /stabilization /functional activity         Timed:         Manual Therapy:    15     mins  67227;     Therapeutic Exercise:    15     mins  37159;     Neuromuscular Mikel:        mins  73872;    Therapeutic Activity:          mins  25994;     Gait Training:           mins  67682;     Ultrasound:          mins  34060;    Ionto                                   mins   00817  Self Care                            mins   28999    Un-Timed:  Electrical Stimulation:         mins  24061 ( );  Dry Needling          mins 52007/94142  Traction          mins 12650  Canalith Repos                   mins  78559  Low Eval          Mins  55628  Mod Eval          Mins  65548  High Eval                            Mins  32350  Re-Eval                               mins  06416    Timed Treatment:   30   mins   Total Treatment:     30   mins    Florinda Dominguez, PT, CLT, Cert DN  Physical Therapist  [place] Lic # [id number]

## 2023-06-15 ENCOUNTER — TREATMENT (OUTPATIENT)
Dept: PHYSICAL THERAPY | Facility: CLINIC | Age: 58
End: 2023-06-15
Payer: COMMERCIAL

## 2023-06-15 DIAGNOSIS — M25.572 LEFT ANKLE PAIN, UNSPECIFIED CHRONICITY: ICD-10-CM

## 2023-06-15 DIAGNOSIS — M25.562 CHRONIC PAIN OF LEFT KNEE: ICD-10-CM

## 2023-06-15 DIAGNOSIS — R29.898 WEAKNESS OF LEFT LOWER EXTREMITY: ICD-10-CM

## 2023-06-15 DIAGNOSIS — R26.9 GAIT DISTURBANCE: ICD-10-CM

## 2023-06-15 DIAGNOSIS — S86.012A RUPTURE OF LEFT ACHILLES TENDON, INITIAL ENCOUNTER: Primary | ICD-10-CM

## 2023-06-15 DIAGNOSIS — G89.29 CHRONIC PAIN OF LEFT KNEE: ICD-10-CM

## 2023-06-15 DIAGNOSIS — G89.29 CHRONIC PAIN OF RIGHT KNEE: ICD-10-CM

## 2023-06-15 DIAGNOSIS — M25.561 CHRONIC PAIN OF RIGHT KNEE: ICD-10-CM

## 2023-06-15 NOTE — PROGRESS NOTES
Physical Therapy Daily Treatment Note      Patient: Sweetie Pickard   : 1965  Diagnosis/ICD-10 Code:  Rupture of left Achilles tendon, initial encounter [S86.012A]   Problems Addressed this Visit    None  Visit Diagnoses       Rupture of left Achilles tendon, initial encounter    -  Primary    Chronic pain of left knee        Chronic pain of right knee        Gait disturbance        Weakness of left lower extremity        Left ankle pain, unspecified chronicity              Diagnoses         Codes Comments    Rupture of left Achilles tendon, initial encounter    -  Primary ICD-10-CM: S86.012A  ICD-9-CM: 845.09     Chronic pain of left knee     ICD-10-CM: M25.562, G89.29  ICD-9-CM: 719.46, 338.29     Chronic pain of right knee     ICD-10-CM: M25.561, G89.29  ICD-9-CM: 719.46, 338.29     Gait disturbance     ICD-10-CM: R26.9  ICD-9-CM: 781.2     Weakness of left lower extremity     ICD-10-CM: R29.898  ICD-9-CM: 729.89     Left ankle pain, unspecified chronicity     ICD-10-CM: M25.572  ICD-9-CM: 719.47            Referring practitioner: Satish Garcia MD  Date of Initial Visit: Type: THERAPY  Noted: 2023  Today's Date: 6/15/2023    VISIT#: 5    Subjective Patient reports having L knee pain today, forgot tennis shoe at home for this visit.       Objective     See Exercise, Manual, and Modality Logs for complete treatment.     Assessment/Plan Patient unable to tolerate progressions at this time due to continued pain reported. Patient will continue to benefit from improved L ankle strengthening for improved stability with daily functional activity.   Goals  Plan Goals: STG to be met in 3 wk:  - Increase L ankle DF AROM to 35 deg for improved toe off during ambulation.  - PT to assess SLS in 4-6 visits as tolerance to wt bearing improves.  - Pt to ambulate up to 3 min in clinic in tennis shoes with standard cane and SBA with no LOB for safe ambulation in her home.  LTG to be met in 12 wk:  - Improve FAAM  score to 75% or better ability.  - Increase L ankle strength to 4/5 or greater in all directions in order to walk in her yard (uneven surfaces) safely to help with yard work.  - Pt to ambulate x10 min with least restrictive device and no LOB in order to do light grocery shopping.  - Pt to report ability to stand up to 2 hr with no increase in symptoms in order to return to work.  - Pt to be independent with HEP       Progress per Plan of Care and Progress strengthening /stabilization /functional activity         Timed:         Manual Therapy:    15     mins  98694;     Therapeutic Exercise:    15     mins  21612;     Neuromuscular Mikel:        mins  56656;    Therapeutic Activity:          mins  08429;     Gait Training:           mins  27886;     Ultrasound:          mins  94098;    Ionto                                   mins   83668  Self Care                    _____  mins   88359  Canalith Repos                   mins  15331    Un-Timed:  Electrical Stimulation:         mins  53885 ( );  Dry Needling          mins self-pay   Traction          mins 51299    Timed Treatment:   30   mins   Total Treatment:     30   mins    Eloy Blue PTA  [place] License [id number]  Physical Therapist Assistant

## 2023-11-02 ENCOUNTER — TRANSCRIBE ORDERS (OUTPATIENT)
Dept: ADMINISTRATIVE | Facility: HOSPITAL | Age: 58
End: 2023-11-02
Payer: COMMERCIAL

## 2023-11-02 DIAGNOSIS — Z12.31 BREAST CANCER SCREENING BY MAMMOGRAM: Primary | ICD-10-CM

## 2023-12-13 ENCOUNTER — HOSPITAL ENCOUNTER (OUTPATIENT)
Dept: MAMMOGRAPHY | Facility: HOSPITAL | Age: 58
Discharge: HOME | End: 2023-12-13
Admitting: NURSE PRACTITIONER
Payer: COMMERCIAL

## 2023-12-13 DIAGNOSIS — Z12.31 BREAST CANCER SCREENING BY MAMMOGRAM: ICD-10-CM

## 2023-12-13 PROCEDURE — 77063 BREAST TOMOSYNTHESIS BI: CPT

## 2023-12-13 PROCEDURE — 77067 SCR MAMMO BI INCL CAD: CPT

## 2024-01-02 ENCOUNTER — DOCUMENTATION (OUTPATIENT)
Dept: PHYSICAL THERAPY | Facility: CLINIC | Age: 59
End: 2024-01-02
Payer: COMMERCIAL

## 2024-01-02 NOTE — PROGRESS NOTES
Discharge Summary  Discharge Summary from Physical Therapy Report      Dates  PT visit: 5/24/2023 - 6/15/2023  Number of Visits: 5     Discharge Status of Patient: See Progress Note dated 6/15/2023    Goals: Not Met    Discharge Plan: Patient to return to referring/providing physician    Comments : Pt did not make any functional progress due to continued pain. Pt's MD was referring her to knee specialist and cancelled remaining appts.     Date of Discharge 1/2/2024        Florinda Dominguez, PT  Physical Therapist  [place] Lic# [id number]

## 2024-11-19 ENCOUNTER — TRANSCRIBE ORDERS (OUTPATIENT)
Dept: ADMINISTRATIVE | Facility: HOSPITAL | Age: 59
End: 2024-11-19
Payer: COMMERCIAL

## 2024-11-19 DIAGNOSIS — Z12.31 SCREENING MAMMOGRAM FOR BREAST CANCER: Primary | ICD-10-CM

## 2024-12-16 ENCOUNTER — HOSPITAL ENCOUNTER (OUTPATIENT)
Dept: MAMMOGRAPHY | Facility: HOSPITAL | Age: 59
Discharge: HOME | End: 2024-12-16
Admitting: NURSE PRACTITIONER
Payer: COMMERCIAL

## 2024-12-16 DIAGNOSIS — Z12.31 SCREENING MAMMOGRAM FOR BREAST CANCER: ICD-10-CM

## 2024-12-16 PROCEDURE — 77067 SCR MAMMO BI INCL CAD: CPT

## 2024-12-16 PROCEDURE — 77063 BREAST TOMOSYNTHESIS BI: CPT
